# Patient Record
Sex: MALE | Race: ASIAN | NOT HISPANIC OR LATINO | URBAN - METROPOLITAN AREA
[De-identification: names, ages, dates, MRNs, and addresses within clinical notes are randomized per-mention and may not be internally consistent; named-entity substitution may affect disease eponyms.]

---

## 2017-10-05 ENCOUNTER — EMERGENCY (EMERGENCY)
Facility: HOSPITAL | Age: 52
LOS: 1 days | Discharge: HOME | End: 2017-10-05
Attending: EMERGENCY MEDICINE | Admitting: EMERGENCY MEDICINE
Payer: MEDICAID

## 2017-10-05 VITALS
OXYGEN SATURATION: 97 % | TEMPERATURE: 99 F | HEART RATE: 62 BPM | RESPIRATION RATE: 17 BRPM | DIASTOLIC BLOOD PRESSURE: 74 MMHG | SYSTOLIC BLOOD PRESSURE: 120 MMHG

## 2017-10-05 LAB
ALBUMIN SERPL ELPH-MCNC: 4.6 G/DL — SIGNIFICANT CHANGE UP (ref 3.3–5)
ALP SERPL-CCNC: 80 U/L — SIGNIFICANT CHANGE UP (ref 40–120)
ALT FLD-CCNC: 22 U/L RC — SIGNIFICANT CHANGE UP (ref 10–45)
ANION GAP SERPL CALC-SCNC: 12 MMOL/L — SIGNIFICANT CHANGE UP (ref 5–17)
APTT BLD: 29 SEC — SIGNIFICANT CHANGE UP (ref 27.5–37.4)
AST SERPL-CCNC: 19 U/L — SIGNIFICANT CHANGE UP (ref 10–40)
BASOPHILS # BLD AUTO: 0 K/UL — SIGNIFICANT CHANGE UP (ref 0–0.2)
BASOPHILS NFR BLD AUTO: 0.2 % — SIGNIFICANT CHANGE UP (ref 0–2)
BILIRUB SERPL-MCNC: 0.6 MG/DL — SIGNIFICANT CHANGE UP (ref 0.2–1.2)
BUN SERPL-MCNC: 8 MG/DL — SIGNIFICANT CHANGE UP (ref 7–23)
CALCIUM SERPL-MCNC: 10.2 MG/DL — SIGNIFICANT CHANGE UP (ref 8.4–10.5)
CHLORIDE SERPL-SCNC: 103 MMOL/L — SIGNIFICANT CHANGE UP (ref 96–108)
CO2 SERPL-SCNC: 27 MMOL/L — SIGNIFICANT CHANGE UP (ref 22–31)
CREAT SERPL-MCNC: 0.92 MG/DL — SIGNIFICANT CHANGE UP (ref 0.5–1.3)
EOSINOPHIL # BLD AUTO: 0.1 K/UL — SIGNIFICANT CHANGE UP (ref 0–0.5)
EOSINOPHIL NFR BLD AUTO: 1 % — SIGNIFICANT CHANGE UP (ref 0–6)
GLUCOSE SERPL-MCNC: 122 MG/DL — HIGH (ref 70–99)
HCT VFR BLD CALC: 42.6 % — SIGNIFICANT CHANGE UP (ref 39–50)
HGB BLD-MCNC: 15.1 G/DL — SIGNIFICANT CHANGE UP (ref 13–17)
INR BLD: 1.07 RATIO — SIGNIFICANT CHANGE UP (ref 0.88–1.16)
LYMPHOCYTES # BLD AUTO: 1.6 K/UL — SIGNIFICANT CHANGE UP (ref 1–3.3)
LYMPHOCYTES # BLD AUTO: 23.6 % — SIGNIFICANT CHANGE UP (ref 13–44)
MCHC RBC-ENTMCNC: 30.3 PG — SIGNIFICANT CHANGE UP (ref 27–34)
MCHC RBC-ENTMCNC: 35.4 GM/DL — SIGNIFICANT CHANGE UP (ref 32–36)
MCV RBC AUTO: 85.5 FL — SIGNIFICANT CHANGE UP (ref 80–100)
MONOCYTES # BLD AUTO: 0.4 K/UL — SIGNIFICANT CHANGE UP (ref 0–0.9)
MONOCYTES NFR BLD AUTO: 5.7 % — SIGNIFICANT CHANGE UP (ref 2–14)
NEUTROPHILS # BLD AUTO: 4.7 K/UL — SIGNIFICANT CHANGE UP (ref 1.8–7.4)
NEUTROPHILS NFR BLD AUTO: 69.4 % — SIGNIFICANT CHANGE UP (ref 43–77)
PLATELET # BLD AUTO: 186 K/UL — SIGNIFICANT CHANGE UP (ref 150–400)
POTASSIUM SERPL-MCNC: 4.2 MMOL/L — SIGNIFICANT CHANGE UP (ref 3.5–5.3)
POTASSIUM SERPL-SCNC: 4.2 MMOL/L — SIGNIFICANT CHANGE UP (ref 3.5–5.3)
PROT SERPL-MCNC: 8.4 G/DL — HIGH (ref 6–8.3)
PROTHROM AB SERPL-ACNC: 11.6 SEC — SIGNIFICANT CHANGE UP (ref 9.8–12.7)
RBC # BLD: 4.99 M/UL — SIGNIFICANT CHANGE UP (ref 4.2–5.8)
RBC # FLD: 11.5 % — SIGNIFICANT CHANGE UP (ref 10.3–14.5)
SODIUM SERPL-SCNC: 142 MMOL/L — SIGNIFICANT CHANGE UP (ref 135–145)
TROPONIN T SERPL-MCNC: <0.01 NG/ML — SIGNIFICANT CHANGE UP (ref 0–0.06)
WBC # BLD: 6.8 K/UL — SIGNIFICANT CHANGE UP (ref 3.8–10.5)
WBC # FLD AUTO: 6.8 K/UL — SIGNIFICANT CHANGE UP (ref 3.8–10.5)

## 2017-10-05 PROCEDURE — 70450 CT HEAD/BRAIN W/O DYE: CPT

## 2017-10-05 PROCEDURE — 85027 COMPLETE CBC AUTOMATED: CPT

## 2017-10-05 PROCEDURE — 93005 ELECTROCARDIOGRAM TRACING: CPT

## 2017-10-05 PROCEDURE — 99285 EMERGENCY DEPT VISIT HI MDM: CPT | Mod: 25

## 2017-10-05 PROCEDURE — 71045 X-RAY EXAM CHEST 1 VIEW: CPT

## 2017-10-05 PROCEDURE — 99284 EMERGENCY DEPT VISIT MOD MDM: CPT | Mod: 25

## 2017-10-05 PROCEDURE — 96374 THER/PROPH/DIAG INJ IV PUSH: CPT

## 2017-10-05 PROCEDURE — 84484 ASSAY OF TROPONIN QUANT: CPT

## 2017-10-05 PROCEDURE — 80053 COMPREHEN METABOLIC PANEL: CPT

## 2017-10-05 PROCEDURE — 85730 THROMBOPLASTIN TIME PARTIAL: CPT

## 2017-10-05 PROCEDURE — 93010 ELECTROCARDIOGRAM REPORT: CPT

## 2017-10-05 PROCEDURE — 85610 PROTHROMBIN TIME: CPT

## 2017-10-05 RX ORDER — MECLIZINE HCL 12.5 MG
25 TABLET ORAL ONCE
Qty: 0 | Refills: 0 | Status: COMPLETED | OUTPATIENT
Start: 2017-10-05 | End: 2017-10-05

## 2017-10-05 RX ORDER — ONDANSETRON 8 MG/1
4 TABLET, FILM COATED ORAL ONCE
Qty: 0 | Refills: 0 | Status: COMPLETED | OUTPATIENT
Start: 2017-10-05 | End: 2017-10-05

## 2017-10-05 RX ORDER — SODIUM CHLORIDE 9 MG/ML
1000 INJECTION INTRAMUSCULAR; INTRAVENOUS; SUBCUTANEOUS
Qty: 0 | Refills: 0 | Status: DISCONTINUED | OUTPATIENT
Start: 2017-10-05 | End: 2017-10-09

## 2017-10-05 RX ADMIN — ONDANSETRON 4 MILLIGRAM(S): 8 TABLET, FILM COATED ORAL at 22:56

## 2017-10-05 RX ADMIN — SODIUM CHLORIDE 150 MILLILITER(S): 9 INJECTION INTRAMUSCULAR; INTRAVENOUS; SUBCUTANEOUS at 22:57

## 2017-10-05 RX ADMIN — Medication 25 MILLIGRAM(S): at 22:56

## 2017-10-05 NOTE — CONSULT NOTE ADULT - ASSESSMENT
51 yo man who presents to [facility] w/ new and acute 12 hour onset of dizziness upon waking up from bed this morning, worse w/ ambulation & when eyes are closed. Patient also noted taking 500mg amoxicillin that he had at home for "ear pain" right before going to bed.  ROS also revealed N/V, otherwise unremarkable for headache, acute vision changes, weakness, or numbness. Neurology consulted for further evaluation of cerebellar infarction. NIHSS 0 MRS 0.

## 2017-10-05 NOTE — ED ADULT NURSE NOTE - OBJECTIVE STATEMENT
51 y/o male presenting to the ED complaining of dizziness; Per patient states woke up this morning and felt like the room was spinning; Patient denies any vision changes; Positive PEERLA; Steady gait noted upon arrival; Patient denies chest pain, SOB, weakness, vomiting, diarrhea, urinary s/s; Positive strength and sensation in extremities; Patient complaining of nausea as well; a&ox3; safety and comfort measures provided

## 2017-10-05 NOTE — CONSULT NOTE ADULT - SUBJECTIVE AND OBJECTIVE BOX
Neurology Consult    Name: YU STEWART    HPI: 53 yo man who presents to [facility] w/ new and acute 12 hour onset of dizziness upon waking up from bed this morning, worse w/ ambulation & when eyes are closed. ROS also revealed N/V, otherwise unremarkable for headache, acute vision changes, weakness, or numbness. Neurology consulted for further evaluation of cerebellar infarction. NIHSS MRS 0.      PMH/PSH: none    MEDICATIONS  (home): none    Allergies: No Known Allergies    Objective:   Vital Signs Last 24 Hrs  T(C): 36.8 (05 Oct 2017 22:55), Max: 37.1 (05 Oct 2017 20:51)  T(F): 98.3 (05 Oct 2017 22:55), Max: 98.7 (05 Oct 2017 20:51)  HR: 66 (05 Oct 2017 22:55) (62 - 66)  BP: 121/85 (05 Oct 2017 22:55) (120/74 - 121/85)  RR: 16 (05 Oct 2017 22:55) (16 - 17)  SpO2: 100% (05 Oct 2017 22:55) (97% - 100%)    General Exam:   General appearance: No acute distress                   Neurological Exam:  Mental Status: AAOx3, fluent speech, follows commands    Cranial Nerves: EOMI, PERRL, V1-V3 intact, facial symmetry intact, no dysarthria, tongue midline, VFF    Motor: 5/5 throughout. No drift x4    Sensation: Intact to LT throughout    Coordination: FTN intact b/l    Reflexes: 1+ bilateral biceps, brachioradialis, patellar and ankle    Gait: normal and stable.      Labs:                Radiology Neurology Consult    Name: YU STEWART    HPI: 51 yo man who presents to [facility] w/ new and acute 12 hour onset of dizziness upon waking up from bed this morning, worse w/ ambulation & when eyes are closed. Patient also noted taking 500mg amoxicillin that he had at home for "ear pain" right before going to bed.  ROS also revealed N/V, otherwise unremarkable for headache, acute vision changes, weakness, or numbness. Neurology consulted for further evaluation of cerebellar infarction. NIHSS 0 MRS 0.      PMH/PSH: none    MEDICATIONS  (home): none    Allergies: No Known Allergies    Objective:   Vital Signs Last 24 Hrs  T(C): 36.8 (05 Oct 2017 22:55), Max: 37.1 (05 Oct 2017 20:51)  T(F): 98.3 (05 Oct 2017 22:55), Max: 98.7 (05 Oct 2017 20:51)  HR: 66 (05 Oct 2017 22:55) (62 - 66)  BP: 121/85 (05 Oct 2017 22:55) (120/74 - 121/85)  RR: 16 (05 Oct 2017 22:55) (16 - 17)  SpO2: 100% (05 Oct 2017 22:55) (97% - 100%)    General Exam:   General appearance: No acute distress                   Neurological Exam:  Mental Status: AAOx3, fluent speech, follows commands    Cranial Nerves: EOMI, no nystagmus, PERRL, V1-V3 intact, facial symmetry intact, no dysarthria, tongue midline, VFF    Motor: 5/5 throughout. No drift x4    Sensation: Intact to LT throughout    Coordination: FTN intact b/l    Reflexes: Babinski absent b/l  (toes downgoing b/l)    Gait: not assessed    Other: Melissa-andrews pike + (right-sided)     Labs:                Radiology

## 2017-10-05 NOTE — ED PROVIDER NOTE - ATTENDING CONTRIBUTION TO CARE
------------ATTENDING NOTE------------   53 yo M w/ wife c/o feeling dizzy, describing vertigo, constant since waking up this evening (>12 hrs), worse w/ walking, worse w/ closing eyes, associated nausea w/o vomiting, no headache, no weakness/numbness, concerns cerebellar CVA, awaiting labs/imaging and Neuro consult -->  - Corey Lara MD   ------------------------------------------------------------------------------ ------------ATTENDING NOTE------------   53 yo M w/ wife c/o feeling dizzy, describing vertigo, constant since waking up this evening (>12 hrs), worse w/ walking, worse w/ closing eyes, associated nausea w/o vomiting, no headache, no weakness/numbness, concerns cerebellar CVA, awaiting labs/imaging and Neuro consult --> symptoms resolved, CT wnl, cleared by Neuro, in depth d/w pt/wife about ddx, tx, schroeder, fu.  - Corey Lara MD   ------------------------------------------------------------------------------

## 2017-10-05 NOTE — ED PROVIDER NOTE - PHYSICAL EXAMINATION
Well Appearing, Nontoxic, NAD;  Symm Facies, PERRL 3mm, (-)Pallor, Anicteric, VF/VA wnl, (+)Nystagmus, TM clear bilar, MMM;  No JVD/Bruits or stridor;  RRR w/o m/g/r;   CTAB w/o w/r/r;   Abd soft, nt/nd, +bs; No edema/calf tender;  No rash;  AOX3, Normal speech, CN grossly intact, normal strength/sensation, +Romberg, ?ataxia heel/shin, NIHSS = 1

## 2017-10-05 NOTE — CONSULT NOTE ADULT - PROBLEM SELECTOR RECOMMENDATION 9
likely peripheral (iatrogenic vs. BPPV), no focal neurologic deficits (NIHSS 0)   -Epley maneuver   -CDU admission for overnight observation & MRI brain for further evaluation (r/o central etiology, although unlikely)   -meclizine for symptomatic care   -Although acute infarct is highly unlikely, given patient's age and pending Lipid Panel, secondary stroke prevention should be at least considered w/ daily baby ASA 81mg.   -ENT outpatient for further evaluation of right-ear pain & tinnitus   -patient can follow up outpatient neurology s/p discharge.

## 2017-10-06 ENCOUNTER — EMERGENCY (EMERGENCY)
Facility: HOSPITAL | Age: 52
LOS: 1 days | End: 2017-10-06
Attending: EMERGENCY MEDICINE | Admitting: EMERGENCY MEDICINE
Payer: MEDICAID

## 2017-10-06 VITALS
DIASTOLIC BLOOD PRESSURE: 87 MMHG | RESPIRATION RATE: 19 BRPM | HEART RATE: 62 BPM | OXYGEN SATURATION: 97 % | TEMPERATURE: 98 F | SYSTOLIC BLOOD PRESSURE: 146 MMHG | WEIGHT: 195.11 LBS

## 2017-10-06 VITALS
TEMPERATURE: 97 F | DIASTOLIC BLOOD PRESSURE: 81 MMHG | HEART RATE: 86 BPM | OXYGEN SATURATION: 99 % | SYSTOLIC BLOOD PRESSURE: 117 MMHG | RESPIRATION RATE: 16 BRPM

## 2017-10-06 DIAGNOSIS — H92.03 OTALGIA, BILATERAL: ICD-10-CM

## 2017-10-06 DIAGNOSIS — R42 DIZZINESS AND GIDDINESS: ICD-10-CM

## 2017-10-06 PROBLEM — Z00.00 ENCOUNTER FOR PREVENTIVE HEALTH EXAMINATION: Status: ACTIVE | Noted: 2017-10-06

## 2017-10-06 LAB
ALBUMIN SERPL ELPH-MCNC: 4.4 G/DL — SIGNIFICANT CHANGE UP (ref 3.3–5)
ALP SERPL-CCNC: 67 U/L — SIGNIFICANT CHANGE UP (ref 40–120)
ALT FLD-CCNC: 18 U/L RC — SIGNIFICANT CHANGE UP (ref 10–45)
ANION GAP SERPL CALC-SCNC: 12 MMOL/L — SIGNIFICANT CHANGE UP (ref 5–17)
AST SERPL-CCNC: 17 U/L — SIGNIFICANT CHANGE UP (ref 10–40)
BASOPHILS # BLD AUTO: 0 K/UL — SIGNIFICANT CHANGE UP (ref 0–0.2)
BASOPHILS NFR BLD AUTO: 0.3 % — SIGNIFICANT CHANGE UP (ref 0–2)
BILIRUB SERPL-MCNC: 0.5 MG/DL — SIGNIFICANT CHANGE UP (ref 0.2–1.2)
BUN SERPL-MCNC: 9 MG/DL — SIGNIFICANT CHANGE UP (ref 7–23)
CALCIUM SERPL-MCNC: 9.7 MG/DL — SIGNIFICANT CHANGE UP (ref 8.4–10.5)
CHLORIDE SERPL-SCNC: 105 MMOL/L — SIGNIFICANT CHANGE UP (ref 96–108)
CO2 SERPL-SCNC: 28 MMOL/L — SIGNIFICANT CHANGE UP (ref 22–31)
CREAT SERPL-MCNC: 1.05 MG/DL — SIGNIFICANT CHANGE UP (ref 0.5–1.3)
EOSINOPHIL # BLD AUTO: 0 K/UL — SIGNIFICANT CHANGE UP (ref 0–0.5)
EOSINOPHIL NFR BLD AUTO: 0.6 % — SIGNIFICANT CHANGE UP (ref 0–6)
GLUCOSE SERPL-MCNC: 104 MG/DL — HIGH (ref 70–99)
HCT VFR BLD CALC: 40.8 % — SIGNIFICANT CHANGE UP (ref 39–50)
HGB BLD-MCNC: 14.1 G/DL — SIGNIFICANT CHANGE UP (ref 13–17)
LYMPHOCYTES # BLD AUTO: 1.4 K/UL — SIGNIFICANT CHANGE UP (ref 1–3.3)
LYMPHOCYTES # BLD AUTO: 22.7 % — SIGNIFICANT CHANGE UP (ref 13–44)
MCHC RBC-ENTMCNC: 29.8 PG — SIGNIFICANT CHANGE UP (ref 27–34)
MCHC RBC-ENTMCNC: 34.6 GM/DL — SIGNIFICANT CHANGE UP (ref 32–36)
MCV RBC AUTO: 86.2 FL — SIGNIFICANT CHANGE UP (ref 80–100)
MONOCYTES # BLD AUTO: 0.5 K/UL — SIGNIFICANT CHANGE UP (ref 0–0.9)
MONOCYTES NFR BLD AUTO: 8.4 % — SIGNIFICANT CHANGE UP (ref 2–14)
NEUTROPHILS # BLD AUTO: 4.2 K/UL — SIGNIFICANT CHANGE UP (ref 1.8–7.4)
NEUTROPHILS NFR BLD AUTO: 68.1 % — SIGNIFICANT CHANGE UP (ref 43–77)
PLATELET # BLD AUTO: 177 K/UL — SIGNIFICANT CHANGE UP (ref 150–400)
POTASSIUM SERPL-MCNC: 3.9 MMOL/L — SIGNIFICANT CHANGE UP (ref 3.5–5.3)
POTASSIUM SERPL-SCNC: 3.9 MMOL/L — SIGNIFICANT CHANGE UP (ref 3.5–5.3)
PROT SERPL-MCNC: 7.7 G/DL — SIGNIFICANT CHANGE UP (ref 6–8.3)
RBC # BLD: 4.73 M/UL — SIGNIFICANT CHANGE UP (ref 4.2–5.8)
RBC # FLD: 11.5 % — SIGNIFICANT CHANGE UP (ref 10.3–14.5)
SODIUM SERPL-SCNC: 145 MMOL/L — SIGNIFICANT CHANGE UP (ref 135–145)
WBC # BLD: 6.2 K/UL — SIGNIFICANT CHANGE UP (ref 3.8–10.5)
WBC # FLD AUTO: 6.2 K/UL — SIGNIFICANT CHANGE UP (ref 3.8–10.5)

## 2017-10-06 PROCEDURE — 70450 CT HEAD/BRAIN W/O DYE: CPT | Mod: 26

## 2017-10-06 PROCEDURE — 71010: CPT | Mod: 26

## 2017-10-06 PROCEDURE — 99220: CPT

## 2017-10-06 RX ORDER — MECLIZINE HCL 12.5 MG
25 TABLET ORAL ONCE
Qty: 0 | Refills: 0 | Status: COMPLETED | OUTPATIENT
Start: 2017-10-06 | End: 2017-10-06

## 2017-10-06 RX ORDER — SODIUM CHLORIDE 9 MG/ML
3 INJECTION INTRAMUSCULAR; INTRAVENOUS; SUBCUTANEOUS EVERY 8 HOURS
Qty: 0 | Refills: 0 | Status: DISCONTINUED | OUTPATIENT
Start: 2017-10-06 | End: 2017-10-10

## 2017-10-06 RX ADMIN — SODIUM CHLORIDE 3 MILLILITER(S): 9 INJECTION INTRAMUSCULAR; INTRAVENOUS; SUBCUTANEOUS at 21:19

## 2017-10-06 RX ADMIN — Medication 25 MILLIGRAM(S): at 17:50

## 2017-10-06 NOTE — CONSULT NOTE ADULT - SUBJECTIVE AND OBJECTIVE BOX
CC: Left ear pain    HPI: Patient is a 52y old Male with no significant PMHx seen at [facility] yesterday for sudden dizziness which started 2 days ago. Patient returns today with same complaint and also states he has ear pain bilaterally which started 2 days ago. Pt states he has chronic itching of his ear canals after showering typically. Yesterday, after showering he inserted ear drops, itched with q-tips and placed cotton balls to soak up the fluid from his ears. Patient does not know the name of the ear drops. He bought them from [place]. Patient complains of tinnitus of ears bilaterally since this and states he woke up the next morning feeling dizzy. He feels room spins for about 40 secs when he goes from a supine position to sitting position and then stops. Denies any hearing loss, discharge from ears, N/V, SOB, headaches, nasal congestion, rhinorrhea, or fever.    PAST MEDICAL & SURGICAL HISTORY:  No pertinent past medical history  No significant past surgical history    Allergies    No Known Allergies    Intolerances      MEDICATIONS  (STANDING):  sodium chloride 0.9% lock flush 3 milliLiter(s) IV Push every 8 hours    MEDICATIONS  (PRN):    Social History: No history of tobacco use, EtOH use, illicit drugs    ROS: ENT, GI, , CV, Pulm, Neuro, Psych, MS, Heme, Endo, Constitutional; all negative except as noted in HPI    Vital Signs Last 24 Hrs  T(C): 36.7 (06 Oct 2017 19:37), Max: 36.9 (06 Oct 2017 16:47)  T(F): 98 (06 Oct 2017 19:37), Max: 98.4 (06 Oct 2017 16:47)  HR: 60 (06 Oct 2017 19:37) (60 - 86)  BP: 125/80 (06 Oct 2017 19:37) (117/81 - 146/87)  BP(mean): --  RR: 18 (06 Oct 2017 19:37) (16 - 19)  SpO2: 99% (06 Oct 2017 19:37) (97% - 100%)                          14.1   6.2   )-----------( 177      ( 06 Oct 2017 18:00 )             40.8    10-06    145  |  105  |  9   ----------------------------<  104<H>  3.9   |  28  |  1.05    Ca    9.7      06 Oct 2017 18:00    TPro  7.7  /  Alb  4.4  /  TBili  0.5  /  DBili  x   /  AST  17  /  ALT  18  /  AlkPhos  67  10-06   PT/INR - ( 05 Oct 2017 22:56 )   PT: 11.6 sec;   INR: 1.07 ratio         PTT - ( 05 Oct 2017 22:56 )  PTT:29.0 sec    PHYSICAL EXAM:  Gen: Awake and alert, NAD, well-developed  Head: Normocephalic, Atraumatic  Face: no edema/erythema/fluctuance, parotid glands soft without mass  Eyes: PERRL, EOMI, no scleral injection  Ears: Right - ear canal minimal cerumen, TM intact without effusion. No edema, erythema, pus            Left - ear canal minimal cerumen, TM intact without effusion. No edema, erythema, pus  Nose: Nares bilaterally patent, no discharge  Mouth: Mucosa moist, tongue/uvula midline, oropharynx clear  Neck: Flat, supple, no lymphadenopathy, trachea midline, no masses  Resp: breathing easily, no stridor  CV: No peripheral edema or cyanosis
Neurology Consult Note    Pt is a 51 yo man who returns to [facility] w/ due to dizziness upon waking up from bed yesterday morning, worse w/ ambulation & when eyes are closed. Patient also noted taking 500mg amoxicillin that he had at home for "ear pain" right before going to bed.  ROS also revealed N/V, otherwise unremarkable for headache, acute vision changes, weakness, or numbness. He came into the ED yesterday however did not stay for MRI in the CDU.  Neurology was re-consulted for further evaluation of cerebellar infarction. NIHSS 0 MRS 0.      PMH/PSH: none    MEDICATIONS  (STANDING):  sodium chloride 0.9% lock flush 3 milliLiter(s) IV Push every 8 hours    MEDICATIONS  (PRN):    Vital Signs Last 24 Hrs  T(C): 36.7 (06 Oct 2017 19:37), Max: 37.1 (05 Oct 2017 20:51)  T(F): 98 (06 Oct 2017 19:37), Max: 98.7 (05 Oct 2017 20:51)  HR: 60 (06 Oct 2017 19:37) (60 - 86)  BP: 125/80 (06 Oct 2017 19:37) (117/81 - 146/87)  BP(mean): --  RR: 18 (06 Oct 2017 19:37) (16 - 19)  SpO2: 99% (06 Oct 2017 19:37) (97% - 100%)    General Exam:   General appearance: No acute distress                   Neurological Exam:  Mental Status: AAOx3, fluent speech, follows commands    Cranial Nerves: EOMI, no nystagmus, PERRL, V1-V3 intact, facial symmetry intact, no dysarthria, tongue midline, VFF    Motor: 5/5 throughout. No drift x4    Sensation: Intact to LT throughout    Coordination: FTN intact b/l    Reflexes: Babinski absent b/l  (toes downgoing b/l)    Gait: not assessed

## 2017-10-06 NOTE — ED ADULT NURSE REASSESSMENT NOTE - NS ED NURSE REASSESS COMMENT FT1
Received pt from ARNOLDO ROJO) , received pt alert and responsive, oriented x4, denies any respiratory distress, SOB, or difficulty breathing. Pt transferred to CDU for observation for dizziness, pt states when he is laying or sitting still he does not feel dizzy, + dizzy sensation with movement/ ambulation feeling like " everything is spinning" neuro check completed, neuro intact no deficits. will assist as needed, IV in place, patent and free of signs of infiltration, placed on continuos cardiac monitoring as ordered, NSR HR in the,  pt denies chest pain or palpitations, V/S stable, pt afebrile, pt denies pain at this time. Pt educated on unit and unit rules, instructed patient to notify RN of any needed assistance, Pt verbalizes understanding, Call bell placed within reach. Safety maintained. Will continue to monitor. Received pt from ARNOLDO ROJO) , received pt alert and responsive, oriented x4, denies any respiratory distress, SOB, or difficulty breathing. Pt transferred to CDU for observation for dizziness, pt states when he is laying or sitting still he does not feel dizzy, +dizziness with movement/ ambulation pt states sensation feels like " everything is spinning" neuro check completed, neuro intact no deficits. denies any blurred vision or changes in vision, denies headache. Pt denies chest pain or palpitations,  will assist as needed, IV in place, patent and free of signs of infiltration, placed on continuous cardiac monitoring as ordered, NSR HR in the 70's,   V/S stable, pt afebrile, pt denies pain at this time. Pt educated on unit and unit rules, instructed patient to notify RN of any needed assistance, Pt verbalizes understanding, Call bell placed within reach. Safety maintained. Will continue to monitor. Pending MRI/ MRA. Received pt from ARNOLDO ROJO) , received pt alert and responsive, oriented x4, denies any respiratory distress, SOB, or difficulty breathing. Pt transferred to CDU for observation for dizziness, pt states when he is laying or sitting still he does not feel dizzy, +dizziness with movement/ ambulation pt states sensation feels like " everything is spinning" neuro check completed, neuro intact no deficits. denies any blurred vision or changes in vision, denies headache, denies L ear pain/ tinnitus. Pt denies chest pain or palpitations,  will assist as needed, IV in place, patent and free of signs of infiltration, placed on continuous cardiac monitoring as ordered, NSR HR in the 70's,   V/S stable, pt afebrile, pt denies pain at this time. Pt educated on unit and unit rules, instructed patient to notify RN of any needed assistance, Pt verbalizes understanding, Call bell placed within reach. Safety maintained. Will continue to monitor. Pending MRI/ MRA.

## 2017-10-06 NOTE — ED CDU PROVIDER NOTE - OBJECTIVE STATEMENT
52 YOM presents to ed for repeat visit with similar symptoms as yesterday. Pt has had dizzy, describing vertigo, constant since yesterday worse w/ walking, worse w/ closing eyes, associated nausea. Pt also has Left ear pain that started before the vertigo symptoms and ringing in the left ear.      In ED patient describes onset of dizziness (room spinning yesterday) which has been constant and worse with movement. Today with 2 episodes of vomiting. Describes b/l tinnitus and left ear pain for 3 days. Symptoms unrelieved with meclizine

## 2017-10-06 NOTE — ED ADULT NURSE REASSESSMENT NOTE - NS ED NURSE REASSESS COMMENT FT1
Patient appears to be resting comfortably in stretcher; Patient denies dizziness currently; Patient denies chest pain, sob, dizziness, n/v/d; a&ox3; safety and comfort measures provided

## 2017-10-06 NOTE — ED CDU PROVIDER NOTE - MEDICAL DECISION MAKING DETAILS
Pt with vertigo persistent nausea and vomiting without response to usual meds. With tinnitus, For mri/ent consult neuro checks  Zane Freeman MD, Facep

## 2017-10-06 NOTE — ED CDU PROVIDER NOTE - PLAN OF CARE
1. Follow up with your primary care doctor or medicine clinic [phone] in the next 1-2 days (bring results with you)  2. Follow up with neurology _______   3. Follow up with ENT  _____  4. Return to ED for change of symptoms including worsening dizziness, headaches, worsening nausea, vomiting, visual changes, weakness, fevers and any other symptoms of concern 1. Follow up with your primary care doctor or medicine clinic [phone] in the next 1-2 days (bring results with you)  2. Follow up with neurology [phone]  3. Follow up with ENT at [facility] .  4. Return to ED for change of symptoms including worsening dizziness, headaches, worsening nausea, vomiting, visual changes, weakness, fevers and any other symptoms of concern  5. use debrox for ear itching 1. Follow up with your primary care doctor or medicine clinic [phone] in the next 1-2 days (bring results with you)  2. Take meclizine 25mg up to twice daily for dizziness. Follow up with neurology [phone]  3. Use Debrox ear drops for ear itching: instill 5 drops into each ear twice daily for up to 4 days. Follow up with ENT at [facility] .   4. Return to ED for change of symptoms including worsening dizziness, headaches, worsening nausea, vomiting, visual changes, weakness, fevers and any other symptoms of concern 1. Follow up with your primary care doctor or medicine clinic [phone] in the next 1-2 days (bring printed results with you)  2. Take meclizine 25mg up to twice daily for dizziness. Follow up with neurology [phone] as needed for further evaluation.  3. Use Debrox ear drops for ear itching: instill 5 drops into each ear twice daily for up to 4 days. Follow up with ENT Dr. Guidry at [facility] .   4. Return to ED for worsening or change of symptoms including worsening dizziness, headaches, worsening nausea, vomiting, visual changes, weakness, fevers and any other symptoms of concern.

## 2017-10-06 NOTE — ED CDU PROVIDER NOTE - DETAILS
Frequent reevals, neuro checks q 4 hours, MRI head, neuro and ENT consult discussed with attending Dr. Freeman

## 2017-10-06 NOTE — ED PROVIDER NOTE - ATTENDING CONTRIBUTION TO CARE
Private Physician None,   52y  male pmh chronic ear infections, comes to ed complains of vertigo yesterday. Was seen in ED and rec MRI. Today now returns with persisetent symptoms, NV x2, No fever chills shortness of breath,chest pain. PE WDWN NCAT,  NECK Clear anterior & posterior abd soft +bs angel gcs 15 speech fluent, power 5.5 all extr pain light touch intact  Zane Freeman MD, Facep

## 2017-10-06 NOTE — ED ADULT NURSE NOTE - CHPI ED SYMPTOMS NEG
no change in level of consciousness/no numbness/no confusion/no weakness/no loss of consciousness/no blurred vision/no fever

## 2017-10-06 NOTE — ED CDU PROVIDER NOTE - PHYSICAL EXAMINATION
CN 2-12 intact. + horizontal nystagmus most notable w/ leftward gaze. Normal sensation and strength of upper and lower extremities b/l. FROM of neck, upper and lower extremities b/l. No focal neuro deficits.

## 2017-10-06 NOTE — ED PROVIDER NOTE - NS ED ROS FT
CONSTITUTIONAL: No fevers, no chills  Eyes: no visual changes  Ears: no ear drainage,+ ear pain  Nose: no nasal congestion  Mouth/Throat: no sore throat  Cardiovascular: No Chest pain  Respiratory: No SOB  Gastrointestinal: No n/v/d, no abd pain  Genitourinary: no dysuria, no hematuria  SKIN: no rashes.  NEURO: no headache, +vertigo   PSYCHIATRIC: no known mental health issues.

## 2017-10-06 NOTE — ED CDU PROVIDER NOTE - ATTENDING CONTRIBUTION TO CARE
I have personally performed a face to face diagnostic evaluation on this patient.  I have reviewed the ACP note and agree with the history, exam, and plan of care, except as noted.  History and Exam by me shows  See ED provider note  Zane Freeman MD, Facep

## 2017-10-06 NOTE — ED PROVIDER NOTE - OBJECTIVE STATEMENT
52 YOM presents to ed for repeat visit with similar symptoms as yesterday. Pt has had dizzy, describing vertigo, constant since yesterday worse w/ walking, worse w/ closing eyes, associated nausea. Pt also has Left ear pain that started before the vertigo symptoms and ringing in the left ear.

## 2017-10-06 NOTE — CONSULT NOTE ADULT - ASSESSMENT
Pt is a 53 yo man who returns to [facility] w/ due to dizziness upon waking up from bed yesterday morning, worse w/ ambulation & when eyes are closed. Patient also noted taking 500mg amoxicillin that he had at home for "ear pain" right before going to bed.  ROS also revealed N/V, otherwise unremarkable for headache, acute vision changes, weakness, or numbness. He came into the ED yesterday however did not stay for MRI in the CDU.  Neurology was re-consulted for further evaluation of cerebellar infarction. NIHSS 0 MRS 0.      Recommendations: likely peripheral (iatrogenic vs. BPPV), no focal neurologic deficits (NIHSS 0)   -CDU admission for overnight observation & MRI brain for further evaluation (r/o central etiology, although unlikely)   -meclizine for symptomatic care   -Although acute infarct is highly unlikely, given patient's age and pending Lipid Panel, secondary stroke prevention should be at least considered w/ daily ASA 81mg.   -ENT to see the pt, right-ear pain & tinnitus   -patient can follow up outpatient neurology if MRI in normal
51 y/o M with mild ear pain/tinnitus post q-tip use to aggressively itch ears 2 days ago. Normal ear exam. Pt currently stable.

## 2017-10-06 NOTE — ED ADULT NURSE NOTE - OBJECTIVE STATEMENT
51 y/o male presents to ED c/o dizziness since last night, left ear pain that has been bothering him for months. Pt denies having history of vertigo. Pt stats he feels like room is spinning. Also reports nausea and worsening of symptoms when he walks. States he has 1 episode of vomiting this morning. Pt denies chest pain, SOB. Lungs clear b/l. Skin warm, dry, intact. Gross motor and neuro intact. PERRL. Pt safety and comfort provided.

## 2017-10-06 NOTE — ED CDU PROVIDER NOTE - PROGRESS NOTE DETAILS
CDU NOTE ROSAURA SHEETS: NAD VSS.  Patient resting comfortably and has no current complaints. no current dizziness. awaiting neuro and ent eval CDU NOTE ROSAURA SHEETS: NAD VSS.  Patient sleeping. NAD. No complaints. ENT evaluated patient, still awaiting neurology Patient sleeping. NAD. No complaints. VSS. - Sanjana Magana PA-C Patient seen and evaluated at bedside. NAD. Reports intermittent dizziness. Denies nausea/vomiting. VSS. On exam, + horizontal nystagmus L>R. No events on tele. - Jackie Mares PA-C Pt with negative MRI. Spoke with neurology resident, reports Dr. Perez will be coming to see patient in CDU. - Jackie Mares PA-C Patient feeling well. Denies current dizziness, nausea, vomiting. Discussed MRI results and d/c plan with patient. Patient is to use debrox drops x 4 days, take meclizine prn, and f/u with ENT as outpatient. Patient seen by neurology with no further w/u required. VSS. D/w Dr. Pierce. - Jackie Mares, PA-C I have personally performed a face to face diagnostic evaluation on this patient.  I have reviewed the ACP note and agree with the history, exam, and plan of care, except as noted.  History and Exam by me shows  52M presents to the ED with persistent dizziness like the room is spinning in the setting of recent ED visit - pt was placed in CDU for MRI - MRI showed no signs of VBI. Neuro evaluated patient and cleared for dc. pt says that he feels like the dizziness started with ear fullness and has had this before. exam with CN2-12 intact normal strenght and sensation. Tolerating PO. normal vitals. ambulating without issue. Likely inner ear problem and peripheral vertigo. Will have patient follow up with ENT. all questions answered. Corey Pierce M.D., Attending Physician

## 2017-10-06 NOTE — ED CDU PROVIDER NOTE - ENMT, MLM
Airway patent. Nasal mucosa clear. Mouth with normal mucosa. Throat has no vesicles, no oropharyngeal exudates and uvula is midline. TM's with cloudiness b/l Airway patent. Nasal mucosa clear. Mouth with normal mucosa. Throat has no vesicles, no oropharyngeal exudates and uvula is midline. TM's with cloudiness and mild erythema b/l

## 2017-10-07 VITALS
DIASTOLIC BLOOD PRESSURE: 79 MMHG | RESPIRATION RATE: 18 BRPM | SYSTOLIC BLOOD PRESSURE: 118 MMHG | TEMPERATURE: 98 F | HEART RATE: 69 BPM | OXYGEN SATURATION: 98 %

## 2017-10-07 LAB
CHOLEST SERPL-MCNC: 174 MG/DL — SIGNIFICANT CHANGE UP (ref 10–199)
HBA1C BLD-MCNC: 5.4 % — SIGNIFICANT CHANGE UP (ref 4–5.6)
HDLC SERPL-MCNC: 34 MG/DL — LOW (ref 40–125)
LIPID PNL WITH DIRECT LDL SERPL: 115 MG/DL — SIGNIFICANT CHANGE UP
TOTAL CHOLESTEROL/HDL RATIO MEASUREMENT: 5.1 RATIO — SIGNIFICANT CHANGE UP (ref 3.4–9.6)
TRIGL SERPL-MCNC: 125 MG/DL — SIGNIFICANT CHANGE UP (ref 10–149)

## 2017-10-07 PROCEDURE — 70551 MRI BRAIN STEM W/O DYE: CPT

## 2017-10-07 PROCEDURE — 70553 MRI BRAIN STEM W/O & W/DYE: CPT | Mod: 26

## 2017-10-07 PROCEDURE — 70548 MR ANGIOGRAPHY NECK W/DYE: CPT

## 2017-10-07 PROCEDURE — G0378: CPT

## 2017-10-07 PROCEDURE — 70544 MR ANGIOGRAPHY HEAD W/O DYE: CPT | Mod: 26,59

## 2017-10-07 PROCEDURE — A9585: CPT

## 2017-10-07 PROCEDURE — 99217: CPT

## 2017-10-07 PROCEDURE — 83036 HEMOGLOBIN GLYCOSYLATED A1C: CPT

## 2017-10-07 PROCEDURE — 70547 MR ANGIOGRAPHY NECK W/O DYE: CPT | Mod: 26

## 2017-10-07 PROCEDURE — 85027 COMPLETE CBC AUTOMATED: CPT

## 2017-10-07 PROCEDURE — 99284 EMERGENCY DEPT VISIT MOD MDM: CPT | Mod: 25

## 2017-10-07 PROCEDURE — 80053 COMPREHEN METABOLIC PANEL: CPT

## 2017-10-07 PROCEDURE — 99282 EMERGENCY DEPT VISIT SF MDM: CPT

## 2017-10-07 PROCEDURE — 80061 LIPID PANEL: CPT

## 2017-10-07 PROCEDURE — 70544 MR ANGIOGRAPHY HEAD W/O DYE: CPT

## 2017-10-07 RX ORDER — CARBAMIDE PEROXIDE 81.86 MG/ML
5 SOLUTION/ DROPS AURICULAR (OTIC)
Qty: 1 | Refills: 0
Start: 2017-10-07

## 2017-10-07 RX ORDER — CARBAMIDE PEROXIDE 81.86 MG/ML
5 SOLUTION/ DROPS AURICULAR (OTIC) DAILY
Qty: 0 | Refills: 0 | Status: DISCONTINUED | OUTPATIENT
Start: 2017-10-07 | End: 2017-10-07

## 2017-10-07 RX ORDER — MECLIZINE HCL 12.5 MG
1 TABLET ORAL
Qty: 14 | Refills: 0
Start: 2017-10-07 | End: 2017-10-14

## 2017-10-07 RX ADMIN — SODIUM CHLORIDE 3 MILLILITER(S): 9 INJECTION INTRAMUSCULAR; INTRAVENOUS; SUBCUTANEOUS at 05:51

## 2017-10-07 NOTE — ED ADULT NURSE REASSESSMENT NOTE - NS ED NURSE REASSESS COMMENT FT1
12.00 Pt had no signs of vertigo in CDU pt tolerated the oral fluids/food  well. Flora N/V/D fever chills CP/SOB  afebrile here Pt was evaluated by CDU team & Neuro team. ROSAURA Mejia educated the pt n follow up care medication  & pt verbalized the understanding on the same matter

## 2017-10-07 NOTE — ED ADULT NURSE REASSESSMENT NOTE - NS ED NURSE REASSESS COMMENT FT1
0815 Pt  resting   .  Flora pain N/V/D fever chills cp SOB Afebrile here   has stable vital signs . NSR on  cardiac monitor Pt went for MRI

## 2017-10-07 NOTE — ED ADULT NURSE REASSESSMENT NOTE - COMFORT CARE
plan of care explained/darkened lights/warm blanket provided/repositioned
plan of care explained/wait time explained/repositioned/side rails up

## 2017-10-07 NOTE — ED ADULT NURSE REASSESSMENT NOTE - NS ED NURSE REASSESS COMMENT FT1
Pt denies any complaints, VSS, on telemetry SR on monitor hr: 60's.  Pt denies pain/discomfort at this time. Neuro intact, monitoring continues. Asleep on stretcher. Pt denies any complaints, VSS, on telemetry SR on monitor hr: 60's.  Pt denies pain/discomfort at this time. Neuro intact, monitoring continues. Asleep on stretcher. Call bell at bedside.

## 2017-10-07 NOTE — ED ADULT NURSE REASSESSMENT NOTE - GENERAL PATIENT STATE
improvement verbalized/resting/sleeping/smiling/interactive/cooperative/comfortable appearance
comfortable appearance
comfortable appearance

## 2017-10-09 ENCOUNTER — EMERGENCY (EMERGENCY)
Facility: HOSPITAL | Age: 52
LOS: 1 days | Discharge: HOME | End: 2017-10-09
Admitting: EMERGENCY MEDICINE
Payer: MEDICAID

## 2017-10-09 PROCEDURE — 99283 EMERGENCY DEPT VISIT LOW MDM: CPT

## 2017-10-09 PROCEDURE — 99282 EMERGENCY DEPT VISIT SF MDM: CPT

## 2017-11-30 ENCOUNTER — APPOINTMENT (OUTPATIENT)
Dept: OTOLARYNGOLOGY | Facility: CLINIC | Age: 52
End: 2017-11-30

## 2018-01-20 ENCOUNTER — APPOINTMENT (OUTPATIENT)
Dept: OTOLARYNGOLOGY | Facility: CLINIC | Age: 53
End: 2018-01-20
Payer: MEDICAID

## 2018-01-20 ENCOUNTER — OUTPATIENT (OUTPATIENT)
Dept: OUTPATIENT SERVICES | Facility: HOSPITAL | Age: 53
LOS: 1 days | Discharge: HOME | End: 2018-01-20

## 2018-01-20 VITALS
HEART RATE: 64 BPM | DIASTOLIC BLOOD PRESSURE: 67 MMHG | SYSTOLIC BLOOD PRESSURE: 94 MMHG | WEIGHT: 203 LBS | HEIGHT: 69 IN | BODY MASS INDEX: 30.07 KG/M2

## 2018-01-20 DIAGNOSIS — H93.12 TINNITUS, LEFT EAR: ICD-10-CM

## 2018-01-20 DIAGNOSIS — H83.02 LABYRINTHITIS, LEFT EAR: ICD-10-CM

## 2018-01-20 DIAGNOSIS — M54.2 CERVICALGIA: ICD-10-CM

## 2018-01-20 DIAGNOSIS — H90.3 SENSORINEURAL HEARING LOSS, BILATERAL: ICD-10-CM

## 2018-01-20 PROCEDURE — 99204 OFFICE O/P NEW MOD 45 MIN: CPT

## 2018-01-20 RX ORDER — MECLIZINE HYDROCHLORIDE 25 MG/1
25 TABLET ORAL
Qty: 14 | Refills: 0 | Status: DISCONTINUED | COMMUNITY
Start: 2017-10-07

## 2018-01-20 RX ORDER — FLUOCINOLONE ACETONIDE 0.1 MG/ML
0.01 SOLUTION TOPICAL
Qty: 60 | Refills: 0 | Status: ACTIVE | COMMUNITY
Start: 2017-11-09

## 2018-01-20 RX ORDER — PREDNISONE 10 MG/1
10 TABLET ORAL
Qty: 42 | Refills: 0 | Status: DISCONTINUED | COMMUNITY
Start: 2017-10-23

## 2018-01-20 RX ORDER — OMEPRAZOLE 40 MG/1
40 CAPSULE, DELAYED RELEASE ORAL
Qty: 30 | Refills: 0 | Status: ACTIVE | COMMUNITY
Start: 2017-10-10

## 2018-01-20 RX ORDER — TRIAMCINOLONE ACETONIDE 1 MG/G
0.1 OINTMENT TOPICAL
Qty: 454 | Refills: 0 | Status: ACTIVE | COMMUNITY
Start: 2017-11-09

## 2018-01-20 RX ORDER — HYDROCORTISONE 25 MG/ML
2.5 LOTION TOPICAL
Qty: 59 | Refills: 0 | Status: ACTIVE | COMMUNITY
Start: 2017-11-09

## 2018-01-23 ENCOUNTER — APPOINTMENT (OUTPATIENT)
Dept: NEUROLOGY | Facility: HOSPITAL | Age: 53
End: 2018-01-23

## 2018-01-23 DIAGNOSIS — M54.2 CERVICALGIA: ICD-10-CM

## 2018-01-23 DIAGNOSIS — H93.12 TINNITUS, LEFT EAR: ICD-10-CM

## 2018-01-23 DIAGNOSIS — H90.3 SENSORINEURAL HEARING LOSS, BILATERAL: ICD-10-CM

## 2018-01-23 DIAGNOSIS — H83.02 LABYRINTHITIS, LEFT EAR: ICD-10-CM

## 2019-01-26 ENCOUNTER — EMERGENCY (EMERGENCY)
Facility: HOSPITAL | Age: 54
LOS: 0 days | Discharge: HOME | End: 2019-01-26
Attending: EMERGENCY MEDICINE
Payer: MEDICAID

## 2019-01-26 VITALS
RESPIRATION RATE: 16 BRPM | SYSTOLIC BLOOD PRESSURE: 114 MMHG | DIASTOLIC BLOOD PRESSURE: 65 MMHG | HEIGHT: 69 IN | HEART RATE: 98 BPM | OXYGEN SATURATION: 99 % | TEMPERATURE: 101 F | WEIGHT: 195.11 LBS

## 2019-01-26 VITALS
TEMPERATURE: 99 F | OXYGEN SATURATION: 97 % | RESPIRATION RATE: 17 BRPM | SYSTOLIC BLOOD PRESSURE: 112 MMHG | HEART RATE: 87 BPM | DIASTOLIC BLOOD PRESSURE: 70 MMHG

## 2019-01-26 DIAGNOSIS — J10.1 INFLUENZA DUE TO OTHER IDENTIFIED INFLUENZA VIRUS WITH OTHER RESPIRATORY MANIFESTATIONS: ICD-10-CM

## 2019-01-26 DIAGNOSIS — R05 COUGH: ICD-10-CM

## 2019-01-26 DIAGNOSIS — R52 PAIN, UNSPECIFIED: ICD-10-CM

## 2019-01-26 DIAGNOSIS — R50.9 FEVER, UNSPECIFIED: ICD-10-CM

## 2019-01-26 LAB
ALBUMIN SERPL ELPH-MCNC: 3.5 G/DL — SIGNIFICANT CHANGE UP (ref 3.3–5)
ALP SERPL-CCNC: 74 U/L — SIGNIFICANT CHANGE UP (ref 40–120)
ALT FLD-CCNC: 45 U/L — SIGNIFICANT CHANGE UP (ref 12–78)
ANION GAP SERPL CALC-SCNC: 7 MMOL/L — SIGNIFICANT CHANGE UP (ref 5–17)
AST SERPL-CCNC: 26 U/L — SIGNIFICANT CHANGE UP (ref 15–37)
BILIRUB SERPL-MCNC: 0.5 MG/DL — SIGNIFICANT CHANGE UP (ref 0.2–1.2)
BUN SERPL-MCNC: 12 MG/DL — SIGNIFICANT CHANGE UP (ref 7–23)
CALCIUM SERPL-MCNC: 8.5 MG/DL — SIGNIFICANT CHANGE UP (ref 8.5–10.1)
CHLORIDE SERPL-SCNC: 108 MMOL/L — SIGNIFICANT CHANGE UP (ref 96–108)
CO2 SERPL-SCNC: 24 MMOL/L — SIGNIFICANT CHANGE UP (ref 22–31)
CREAT SERPL-MCNC: 1.04 MG/DL — SIGNIFICANT CHANGE UP (ref 0.5–1.3)
FLU A RESULT: DETECTED
FLU A RESULT: DETECTED
FLUAV AG NPH QL: DETECTED
FLUBV AG NPH QL: SIGNIFICANT CHANGE UP
GLUCOSE SERPL-MCNC: 101 MG/DL — HIGH (ref 70–99)
HCT VFR BLD CALC: 39 % — SIGNIFICANT CHANGE UP (ref 39–50)
HGB BLD-MCNC: 13.1 G/DL — SIGNIFICANT CHANGE UP (ref 13–17)
MCHC RBC-ENTMCNC: 28.4 PG — SIGNIFICANT CHANGE UP (ref 27–34)
MCHC RBC-ENTMCNC: 33.6 GM/DL — SIGNIFICANT CHANGE UP (ref 32–36)
MCV RBC AUTO: 84.4 FL — SIGNIFICANT CHANGE UP (ref 80–100)
NRBC # BLD: 0 /100 WBCS — SIGNIFICANT CHANGE UP (ref 0–0)
PLATELET # BLD AUTO: 175 K/UL — SIGNIFICANT CHANGE UP (ref 150–400)
POTASSIUM SERPL-MCNC: 3.9 MMOL/L — SIGNIFICANT CHANGE UP (ref 3.5–5.3)
POTASSIUM SERPL-SCNC: 3.9 MMOL/L — SIGNIFICANT CHANGE UP (ref 3.5–5.3)
PROT SERPL-MCNC: 7.4 GM/DL — SIGNIFICANT CHANGE UP (ref 6–8.3)
RBC # BLD: 4.62 M/UL — SIGNIFICANT CHANGE UP (ref 4.2–5.8)
RBC # FLD: 12.4 % — SIGNIFICANT CHANGE UP (ref 10.3–14.5)
RSV RESULT: SIGNIFICANT CHANGE UP
RSV RNA RESP QL NAA+PROBE: SIGNIFICANT CHANGE UP
SODIUM SERPL-SCNC: 139 MMOL/L — SIGNIFICANT CHANGE UP (ref 135–145)
WBC # BLD: 5.34 K/UL — SIGNIFICANT CHANGE UP (ref 3.8–10.5)
WBC # FLD AUTO: 5.34 K/UL — SIGNIFICANT CHANGE UP (ref 3.8–10.5)

## 2019-01-26 PROCEDURE — 99284 EMERGENCY DEPT VISIT MOD MDM: CPT

## 2019-01-26 PROCEDURE — 71045 X-RAY EXAM CHEST 1 VIEW: CPT | Mod: 26

## 2019-01-26 RX ORDER — SODIUM CHLORIDE 9 MG/ML
1000 INJECTION INTRAMUSCULAR; INTRAVENOUS; SUBCUTANEOUS ONCE
Qty: 0 | Refills: 0 | Status: COMPLETED | OUTPATIENT
Start: 2019-01-26 | End: 2019-01-26

## 2019-01-26 RX ORDER — ACETAMINOPHEN 500 MG
2 TABLET ORAL
Qty: 40 | Refills: 0 | OUTPATIENT
Start: 2019-01-26 | End: 2019-01-30

## 2019-01-26 RX ORDER — AZITHROMYCIN 500 MG/1
1 TABLET, FILM COATED ORAL
Qty: 4 | Refills: 0
Start: 2019-01-26 | End: 2019-01-29

## 2019-01-26 RX ORDER — AZITHROMYCIN 500 MG/1
1 TABLET, FILM COATED ORAL
Qty: 4 | Refills: 0 | OUTPATIENT
Start: 2019-01-26 | End: 2019-01-29

## 2019-01-26 RX ORDER — ACETAMINOPHEN 500 MG
2 TABLET ORAL
Qty: 40 | Refills: 0
Start: 2019-01-26 | End: 2019-01-30

## 2019-01-26 RX ORDER — IBUPROFEN 200 MG
600 TABLET ORAL ONCE
Qty: 0 | Refills: 0 | Status: COMPLETED | OUTPATIENT
Start: 2019-01-26 | End: 2019-01-26

## 2019-01-26 RX ADMIN — SODIUM CHLORIDE 1000 MILLILITER(S): 9 INJECTION INTRAMUSCULAR; INTRAVENOUS; SUBCUTANEOUS at 22:51

## 2019-01-26 RX ADMIN — Medication 600 MILLIGRAM(S): at 21:30

## 2019-01-26 RX ADMIN — SODIUM CHLORIDE 1000 MILLILITER(S): 9 INJECTION INTRAMUSCULAR; INTRAVENOUS; SUBCUTANEOUS at 21:00

## 2019-01-26 RX ADMIN — Medication 75 MILLIGRAM(S): at 22:51

## 2019-01-26 RX ADMIN — Medication 600 MILLIGRAM(S): at 21:00

## 2019-01-26 NOTE — ED PROVIDER NOTE - PHYSICAL EXAMINATION
Vitals: WNL  Gen: AAOx3, NAD, sitting uncomfortably in stretcher, diaphoretic, answering appropriately, non-toxic   Head: ncat, perrla, eomi b/l  Neck: supple, no lymphadenopathy, no midline deviation  Heart: rrr, no m/r/g  Lungs: CTA b/l, no rales/ronchi/wheezes  Abd: soft, nontender, non-distended, no rebound or guarding  Ext: no clubbing/cyanosis/edema  Neuro: sensation and muscle strength intact b/l, steady gait

## 2019-01-26 NOTE — ED ADULT TRIAGE NOTE - CHIEF COMPLAINT QUOTE
patient reports body aches fever since yesterday, states I think it is because I walked outside one morning with no coat took advil at 11am. came back from [place] on thursday

## 2019-01-26 NOTE — ED ADULT NURSE NOTE - OBJECTIVE STATEMENT
Received in bed 20. AOX4 c/o generalized body aches and chills.   Labs/IVF completed. Dr. Romo DC EKG and not completed.

## 2019-01-26 NOTE — ED PROVIDER NOTE - PROGRESS NOTE DETAILS
Results reported to patient--grossly benign, labs wnl, flu positive on swab, xr has questionable L cardiac infiltrate, will give antbx to cover CAP  Pt. reports feeling better after meds  pt. agrees to f/u with primary care outpt.  pt. understands to return to ED if symptoms worsen; will d/c

## 2019-01-26 NOTE — ED PROVIDER NOTE - OBJECTIVE STATEMENT
52 yo M with cough, fever, congestion, body aches, chills for 1 day.  Pt. admits to walking outside without a jacket; he thinks he might have gotten sick this way.  Pt. notes recent travel to [place], but no sick contacts.  He feels well otherwise.   ROS: negative for headache, chest pain, shortness of breath, abd pain, nausea, vomiting, diarrhea, rash, paresthesia, and weakness--all other systems reviewed are negative.   PMH: negative; Meds: Denies; SH: Denies smoking/drinking/drug use

## 2019-07-11 NOTE — ED CDU PROVIDER NOTE - CPE EDP RESP NORM
FOLLOW UP OFFICE VISIT    DATE: 19    PATIENT: Griselda Peoples  MRN:  [id number]  :  1955      Chief Complaint   Patient presents with   • Follow-up         HISTORY OF PRESENT ILLNESS:  Griselda Peoples is a 63 year old female has had problems with recurrent cellulitis in both lower extremities.  She has been treated at the wound clinic at the [facility].  This apparently is associate with significant pain.    Patient's weight has dropped approximately 31 pounds.  She currently is 303 pounds.    Patient blood pressures 106/72.  He has been well controlled.  Pain chest pressure heaviness.  Patient states her breathing is been quite good.  She can walk up a flight of stairs slowly without any respiratory distress but does have leg pain.  She denies any orthopnea or PND.  She denies any palpitations presyncope or syncope.  She does have leg swelling secondary to the cellulitis and dermatitis.    It has been approximately a year and a half since her angioplasty.  Patient is doing well and she can stop clopidogrel and just be on aspirin.  This was discussed.    Medications were reviewed.  Patient is on metoprolol, potassium, furosemide, spironolactone, atorvastatin, Plavix, and losartan      -------------------------------     Impression     -------------------------------    #1 CAD  Status post acute anteroapical myocardial infarctions 8/15/2017  Infarct probably occurred 3 days prior to admission    Angioplasty 8/15/2017  % mid stented  LAD distal 80% stented  EF 40% with severe septal apical anterior akinesis    2.  Ischemic cardiomyopathy  EF 40%.    3.  Significant lower extremity cellulitis and dermatitis.  Being treated at the wound clinic at [facility]    4.  Morbid obesity.  Has lost 31 pounds and currently weighs 325    5.  Hypercholesterolemia  On a statin      ALLERGIES:  No Known Allergies      Current Outpatient Medications   Medication   • aspirin 81 MG tablet   • metoPROLOL  succinate (TOPROL-XL) 25 MG 24 hr tablet   • potassium CHLORIDE (KLOR-CON M) 20 MEQ lydia ER tablet   • furosemide (LASIX) 20 MG tablet   • spironolactone (ALDACTONE) 25 MG tablet   • atorvastatin (LIPITOR) 40 MG tablet   • clopidogrel (PLAVIX) 75 MG tablet   • losartan (COZAAR) 25 MG tablet   • Multiple Vitamin tablet   • doxycycline hyclate (VIBRAMYCIN) 100 MG capsule   • albuterol (PROAIR HFA) 108 (90 Base) MCG/ACT inhaler   • fluticasone (FLONASE) 50 MCG/ACT nasal spray     No current facility-administered medications for this visit.          Family History   Problem Relation Age of Onset   • Heart disease Father    • Congestive Heart Failure Father          Review of Systems   Constitutional: Negative for fatigue.   HENT: Negative for congestion.    Eyes: Negative.    Respiratory: Negative for chest tightness, shortness of breath and wheezing.    Cardiovascular: Negative for chest pain, palpitations and leg swelling.   Gastrointestinal: Negative for abdominal pain and nausea.   Endocrine: Negative.    Genitourinary: Negative for dysuria and hematuria.   Musculoskeletal: Negative for arthralgias and back pain.        Difficulty walking due to cellulitis and dermatitis lower extremities   Allergic/Immunologic: Negative.    Neurological: Negative for dizziness, syncope, weakness, light-headedness, numbness and headaches.   Hematological: Negative.    Psychiatric/Behavioral: Negative for confusion. The patient is not nervous/anxious.          Visit Vitals  /72   Pulse 72   Ht 6' (1.829 m)   Wt (!) 137.4 kg (303 lb)   BMI 41.09 kg/m²         Physical Exam   Constitutional: She is oriented to person, place, and time.   Significant obesity   HENT:   Head: Normocephalic.   Eyes: Pupils are equal, round, and reactive to light. EOM are normal.   Neck: Normal range of motion. No JVD present. No thyromegaly present.   Cardiovascular: Normal rate, regular rhythm, normal heart sounds and intact distal pulses. Exam  reveals no gallop and no friction rub.   No murmur heard.  Pulmonary/Chest: Breath sounds normal. No respiratory distress. She has no wheezes. She has no rales. She exhibits no tenderness.   Abdominal: Soft. Bowel sounds are normal. She exhibits no distension. There is no tenderness.   Musculoskeletal: Normal range of motion. She exhibits no edema, tenderness or deformity.   Lymphadenopathy:     She has no cervical adenopathy.   Neurological: She is alert and oriented to person, place, and time.   Skin: Skin is warm and dry. No rash noted. No erythema.   Psychiatric: She has a normal mood and affect. Her behavior is normal.   Nursing note and vitals reviewed.      Clinical Data:  The following were personally visualized and interpreted by me:    ECG:     Echocardiogram:     Stress test:     Cardiac cath: 8/15/2017  % stented  Distal LAD 80% stented  EF 40% with severe apical anterior akinesis        ASSESSMENT/PLAN:  Griselda was seen today for follow-up.    Diagnoses and all orders for this visit:    Coronary artery disease of native artery of native heart with stable angina pectoris (CMS/[facility])    H/O heart artery stent    Pure hypercholesterolemia    Essential hypertension        From a cardiac standpoint this patient appears to be doing quite well.  She has not had any anginal symptoms and is moderately active.    I reviewed the patient's cardiac catheterization and revascularization.  I also discussed this with the patient.    Patient did have a significant ischemic cardiomyopathy with a EF of 40%.  It is hard to determine her exercise capacity because she is inactive.    Patient's angioplasty was greater than a year ago.  She can stop Plavix and continue aspirin per day.    Patient needs to continue the other cardiac medications as well.  That includes  Metoprolol 25 mg/day  Potassium chloride  Furosemide 20 mg twice daily  Spironolactone 25 mg half tablet per day  Atorvastatin 40 mg/day  Losartan 25  mg/day    Patient will follow-up in 1 year.  Sooner if she has any cardiac symptoms.      Marcin Silva MD  7/11/2019             normal...

## 2019-09-23 ENCOUNTER — EMERGENCY (EMERGENCY)
Facility: HOSPITAL | Age: 54
LOS: 0 days | Discharge: HOME | End: 2019-09-23
Payer: MEDICAID

## 2019-09-23 VITALS
WEIGHT: 192.02 LBS | HEART RATE: 72 BPM | RESPIRATION RATE: 20 BRPM | DIASTOLIC BLOOD PRESSURE: 68 MMHG | SYSTOLIC BLOOD PRESSURE: 138 MMHG | TEMPERATURE: 98 F | HEIGHT: 69 IN | OXYGEN SATURATION: 100 %

## 2019-09-23 DIAGNOSIS — M79.661 PAIN IN RIGHT LOWER LEG: ICD-10-CM

## 2019-09-23 DIAGNOSIS — M79.651 PAIN IN RIGHT THIGH: ICD-10-CM

## 2019-09-23 PROCEDURE — 93971 EXTREMITY STUDY: CPT | Mod: 26,RT

## 2019-09-23 PROCEDURE — 99284 EMERGENCY DEPT VISIT MOD MDM: CPT

## 2019-09-23 RX ORDER — CYCLOBENZAPRINE HYDROCHLORIDE 10 MG/1
1 TABLET, FILM COATED ORAL
Qty: 12 | Refills: 0
Start: 2019-09-23

## 2019-09-23 RX ORDER — CYCLOBENZAPRINE HYDROCHLORIDE 10 MG/1
10 TABLET, FILM COATED ORAL ONCE
Refills: 0 | Status: COMPLETED | OUTPATIENT
Start: 2019-09-23 | End: 2019-09-23

## 2019-09-23 RX ADMIN — CYCLOBENZAPRINE HYDROCHLORIDE 10 MILLIGRAM(S): 10 TABLET, FILM COATED ORAL at 22:24

## 2019-09-23 NOTE — ED PROVIDER NOTE - OBJECTIVE STATEMENT
53 y/o male with no PMH here c/o R thigh pain x 1 day. 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 denies any fall. he took advil with relief. describes as crampy feeling, no change in sensation. no fevers. no change in gait. pt otherwise has no other complaints    ROS: No fever/chills. No eye pain/changes in vision, No ear pain/sore throat/dysphagia, No chest pain/palpitations. No SOB/cough/. No abdominal pain, N/V/D, no black/bloody bm. No dysuria/frequency/discharge, No headache. No Dizziness.    No rashes or breaks in skin. No numbness/tingling/weakness.

## 2019-09-23 NOTE — ED PROVIDER NOTE - CLINICAL SUMMARY MEDICAL DECISION MAKING FREE TEXT BOX
55 yo male here with R thigh pain, sono neg for DVT, no trauma or injury, likely muscle strain, improved with meds (pt not driving home) pt will fu with pcp, provided ortho fu as well pt is well appearing, in no distress Discussed results and outcome of testing with the patient.  Patient advised to please follow up with their primary care doctor within the next 24 hours and return to the Emergency Department for worsening symptoms or any other concerns.  Patient advised that their doctor may call  to follow up on the specific results of the tests performed today in the emergency department.

## 2019-09-23 NOTE — ED PROVIDER NOTE - PHYSICAL EXAMINATION
Gen: Alert, NAD, well appearing, not toxic  Head: NC, AT, PERRL, EOMI, normal lids/conjunctiva  ENT: B TM WNL, normal hearing, patent oropharynx without erythema/exudate, uvula midline  Neck: +supple, no tenderness/meningismus/JVD, +Trachea midline  Pulm: Bilateral BS, normal resp effort, no wheeze/stridor/retractions  CV: RRR, no M/R/G, +dist pulses  Abd: soft, NT/ND, +BS, no hepatosplenomegaly  Mskel: no edema/erythema/cyanosis RLE without bony tenderness, full rom, able to flex/extend, str 5/5, sensations intact, gait ok, no deformity, no swelling, good pulses  Skin: no rash  Neuro: AAOx3, no sensory/motor deficits, CN 2-12 intact

## 2019-09-23 NOTE — ED PROVIDER NOTE - CONDITION AT DISCHARGE:
Discussion/Summary   Carroll, your pap smear result is normal . You will need to follow up yearly for your annual exam, and for your next pap in 5 years.    Dr. Bhatia        Verified Results  PAP TEST WITH HIGH RISK HPV 2018 12:01AM ZELALEM MOSQUEDA   [2018 12:57PM ZELALEM MOSQUEDA]  neg pap     Test Name Result Flag Reference   PAP WITH HIGH RISK HPV (Report) O    Name: CARROLL LEDESMA       MRN:   [id number]   : 1987           Visit#: [id number]                Gynecologic Cytology Consultation Report      Client:  [facility]      Date Specimen Collected: 18      Accession #: [id number]   Date Specimen Received: 18      Requisition   #:[id number]   Date Reported:      2018 14:10  Location:   [facility]/OB/GYNE   CLINIC                * Addendum Present *   ______________________________________________________________________________   Cytologic Interpretation :      Negative for intraepithelial lesion or malignancy.       Satisfactory for evaluation. Presence of endocervical/transformation zone   component.         TREMAYNE Fagan(ASCP)   ** Electronic Signature ([facility]) 2018  14:10 **      Educational note: The Pap test is a screening test with a well-recognized   false negative rate. The best means available to lower the false negative   rate and to detect early cervical lesions is a Pap test at regular intervals.    All ThinPrep Paps will be reviewed with the aid of the ThinPrep Imaging   System, unless otherwise specified.      ______________________________________________________________________________   Clinical Information:   Menstrual Hx: Regular Menses   Other Clinical Conditions:Pap source: Endocervical   REASON FOR COLLECTION::LOW RISK - ENCOUNTER FOR SCREENING FOR MALIGNANT   NEOPLASM OF CERVIX Z12.4   SOURCE::ENDOCERVICAL      Specimen(s) Submitted:    PAP with High Risk HPV      Procedures/Addenda:    HPV, High Risk_IL:   Date Ordered:   6/27/2018   Date Reported:6/27/2018      Interpretation   Aptima HPV HIGH RISK (TYPES 16,18,31,33,35,39,45,51,52,56,58,59,66,68):   NEGATIVE      The APTIMA HPV Assay is an FDA approved in vitro nucleic acid amplification   test for the qualitative detection of E6/E7 viral messenger RNA (mRNA) from 14   high-risk types of human papillomavirus (HPV) in cervical specimens. The   high-risk HPV types detected by the assay include: 16, 18, 31, 33, 35, 39, 45,   51, 52, 56, 58, 59, 66, and 68. The Aptima HPV Assay does not discriminate   between the 14 high-risk types. Cervical specimens in the Thin Prep Pap Test   vials containing PreservCyt Solution and collected with broom-type or   cytobrush/spatula collection devices may be tested with this assay using the   PANTHER System.       The APTIMA HPV Assay is intended to screen women 21 years and older with   atypical squamous cells of undetermined significance (ASC-US) cervical   cytology results to determine the need for referral to colposcopy. Test   results are not intended to prevent women from proceeding to colposcopy.       In women 30 years and older, the above assay can be used with cervical   cytology to adjunctively screen to assess the presence or absence of high-risk   HPV types. This information, with the physician's assessment of cytology   history, other risk factors, and guidelines, may be used to guide patient   management.                         ** Electronic Signature ** () 6/27/2018 10:47 **            ICD Codes:    Z00.00 Z11.3      Fee Codes:    A: [id number]    HPV_IL: [id number]      Performing Lab Location (Unless otherwise specified):   [address] [address]       Message   please send normal pap letter      Improved

## 2019-09-23 NOTE — ED ADULT NURSE NOTE - NSIMPLEMENTINTERV_GEN_ALL_ED
Implemented All Universal Safety Interventions:  [place] to call system. Call bell, personal items and telephone within reach. Instruct patient to call for assistance. Room bathroom lighting operational. Non-slip footwear when patient is off stretcher. Physically safe environment: no spills, clutter or unnecessary equipment. Stretcher in lowest position, wheels locked, appropriate side rails in place.

## 2019-09-23 NOTE — ED ADULT TRIAGE NOTE - CHIEF COMPLAINT QUOTE
Pt c/o pain to the rt thigh area started about a week ago and worsened yesterday denies any medical hx

## 2021-04-05 ENCOUNTER — APPOINTMENT (OUTPATIENT)
Dept: DISASTER EMERGENCY | Facility: OTHER | Age: 56
End: 2021-04-05
Payer: MEDICAID

## 2021-04-05 PROCEDURE — 0001A: CPT

## 2021-04-26 ENCOUNTER — APPOINTMENT (OUTPATIENT)
Dept: DISASTER EMERGENCY | Facility: OTHER | Age: 56
End: 2021-04-26
Payer: MEDICAID

## 2021-04-26 PROCEDURE — 0002A: CPT

## 2021-07-30 NOTE — ED PROVIDER NOTE - PATIENT PORTAL LINK FT
Pt CO Dizziness and Nausea x 4 days.  Pt states "Last time I was in this ER was for the same thing and they said I had Vertigo."  Noted with HTN, EKG in progress.  Denies vomiting, diarrhea, SOB, Fevers, CP.  FSBG 126 You can access the FollowMyHealth Patient Portal offered by [facility] by registering at the following website: [email or web address]. By joining [employer]’s FollowMyHealth portal, you will also be able to view your health information using other applications (apps) compatible with our system.

## 2021-08-21 ENCOUNTER — EMERGENCY (EMERGENCY)
Facility: HOSPITAL | Age: 56
LOS: 0 days | Discharge: HOME | End: 2021-08-22
Attending: STUDENT IN AN ORGANIZED HEALTH CARE EDUCATION/TRAINING PROGRAM
Payer: MEDICAID

## 2021-08-21 VITALS
WEIGHT: 190.04 LBS | OXYGEN SATURATION: 97 % | DIASTOLIC BLOOD PRESSURE: 70 MMHG | SYSTOLIC BLOOD PRESSURE: 102 MMHG | RESPIRATION RATE: 18 BRPM | HEART RATE: 88 BPM | HEIGHT: 69 IN | TEMPERATURE: 99 F

## 2021-08-21 DIAGNOSIS — R52 PAIN, UNSPECIFIED: ICD-10-CM

## 2021-08-21 DIAGNOSIS — R07.0 PAIN IN THROAT: ICD-10-CM

## 2021-08-21 DIAGNOSIS — R50.9 FEVER, UNSPECIFIED: ICD-10-CM

## 2021-08-21 DIAGNOSIS — U07.1 COVID-19: ICD-10-CM

## 2021-08-21 PROCEDURE — 99284 EMERGENCY DEPT VISIT MOD MDM: CPT

## 2021-08-22 LAB
ALBUMIN SERPL ELPH-MCNC: 3.8 G/DL — SIGNIFICANT CHANGE UP (ref 3.3–5)
ALP SERPL-CCNC: 79 U/L — SIGNIFICANT CHANGE UP (ref 40–120)
ALT FLD-CCNC: 24 U/L — SIGNIFICANT CHANGE UP (ref 12–78)
ANION GAP SERPL CALC-SCNC: 7 MMOL/L — SIGNIFICANT CHANGE UP (ref 5–17)
AST SERPL-CCNC: 15 U/L — SIGNIFICANT CHANGE UP (ref 15–37)
BASOPHILS # BLD AUTO: 0.03 K/UL — SIGNIFICANT CHANGE UP (ref 0–0.2)
BASOPHILS NFR BLD AUTO: 0.5 % — SIGNIFICANT CHANGE UP (ref 0–2)
BILIRUB SERPL-MCNC: 0.7 MG/DL — SIGNIFICANT CHANGE UP (ref 0.2–1.2)
BUN SERPL-MCNC: 13 MG/DL — SIGNIFICANT CHANGE UP (ref 7–23)
CALCIUM SERPL-MCNC: 8.8 MG/DL — SIGNIFICANT CHANGE UP (ref 8.5–10.1)
CHLORIDE SERPL-SCNC: 107 MMOL/L — SIGNIFICANT CHANGE UP (ref 96–108)
CO2 SERPL-SCNC: 28 MMOL/L — SIGNIFICANT CHANGE UP (ref 22–31)
CREAT SERPL-MCNC: 0.93 MG/DL — SIGNIFICANT CHANGE UP (ref 0.5–1.3)
EOSINOPHIL # BLD AUTO: 0.29 K/UL — SIGNIFICANT CHANGE UP (ref 0–0.5)
EOSINOPHIL NFR BLD AUTO: 4.7 % — SIGNIFICANT CHANGE UP (ref 0–6)
GLUCOSE SERPL-MCNC: 97 MG/DL — SIGNIFICANT CHANGE UP (ref 70–99)
HCT VFR BLD CALC: 40.4 % — SIGNIFICANT CHANGE UP (ref 39–50)
HGB BLD-MCNC: 13.8 G/DL — SIGNIFICANT CHANGE UP (ref 13–17)
IMM GRANULOCYTES NFR BLD AUTO: 0.3 % — SIGNIFICANT CHANGE UP (ref 0–1.5)
LYMPHOCYTES # BLD AUTO: 1.79 K/UL — SIGNIFICANT CHANGE UP (ref 1–3.3)
LYMPHOCYTES # BLD AUTO: 29.1 % — SIGNIFICANT CHANGE UP (ref 13–44)
MCHC RBC-ENTMCNC: 28.5 PG — SIGNIFICANT CHANGE UP (ref 27–34)
MCHC RBC-ENTMCNC: 34.2 GM/DL — SIGNIFICANT CHANGE UP (ref 32–36)
MCV RBC AUTO: 83.5 FL — SIGNIFICANT CHANGE UP (ref 80–100)
MONOCYTES # BLD AUTO: 0.79 K/UL — SIGNIFICANT CHANGE UP (ref 0–0.9)
MONOCYTES NFR BLD AUTO: 12.8 % — SIGNIFICANT CHANGE UP (ref 2–14)
NEUTROPHILS # BLD AUTO: 3.24 K/UL — SIGNIFICANT CHANGE UP (ref 1.8–7.4)
NEUTROPHILS NFR BLD AUTO: 52.6 % — SIGNIFICANT CHANGE UP (ref 43–77)
NRBC # BLD: 0 /100 WBCS — SIGNIFICANT CHANGE UP (ref 0–0)
PLATELET # BLD AUTO: 174 K/UL — SIGNIFICANT CHANGE UP (ref 150–400)
POTASSIUM SERPL-MCNC: 4 MMOL/L — SIGNIFICANT CHANGE UP (ref 3.5–5.3)
POTASSIUM SERPL-SCNC: 4 MMOL/L — SIGNIFICANT CHANGE UP (ref 3.5–5.3)
PROT SERPL-MCNC: 7.7 GM/DL — SIGNIFICANT CHANGE UP (ref 6–8.3)
RAPID RVP RESULT: DETECTED
RBC # BLD: 4.84 M/UL — SIGNIFICANT CHANGE UP (ref 4.2–5.8)
RBC # FLD: 12.4 % — SIGNIFICANT CHANGE UP (ref 10.3–14.5)
SARS-COV-2 RNA SPEC QL NAA+PROBE: DETECTED
SODIUM SERPL-SCNC: 142 MMOL/L — SIGNIFICANT CHANGE UP (ref 135–145)
WBC # BLD: 6.16 K/UL — SIGNIFICANT CHANGE UP (ref 3.8–10.5)
WBC # FLD AUTO: 6.16 K/UL — SIGNIFICANT CHANGE UP (ref 3.8–10.5)

## 2021-08-22 PROCEDURE — 71045 X-RAY EXAM CHEST 1 VIEW: CPT | Mod: 26

## 2021-08-22 RX ORDER — SODIUM CHLORIDE 9 MG/ML
1000 INJECTION INTRAMUSCULAR; INTRAVENOUS; SUBCUTANEOUS ONCE
Refills: 0 | Status: COMPLETED | OUTPATIENT
Start: 2021-08-22 | End: 2021-08-22

## 2021-08-22 RX ORDER — AMOXICILLIN 250 MG/5ML
1 SUSPENSION, RECONSTITUTED, ORAL (ML) ORAL
Qty: 20 | Refills: 0
Start: 2021-08-22 | End: 2021-08-31

## 2021-08-22 RX ADMIN — SODIUM CHLORIDE 1000 MILLILITER(S): 9 INJECTION INTRAMUSCULAR; INTRAVENOUS; SUBCUTANEOUS at 00:37

## 2021-08-22 NOTE — ED PROVIDER NOTE - PATIENT PORTAL LINK FT
You can access the FollowMyHealth Patient Portal offered by [facility] by registering at the following website: [email or web address]. By joining [employer]’s FollowMyHealth portal, you will also be able to view your health information using other applications (apps) compatible with our system.

## 2021-08-22 NOTE — ED PROVIDER NOTE - OBJECTIVE STATEMENT
55 y/o M with no pertinent PMHx presents to the ED c/o intermittent fever with body aches and throat pain since Thursday night, which worsened on Friday. Pt reports taking Advil for relief with some improvement, increasing his dosages according to his sx. Endorses sneezing, coughing, difficulty ambulating but denies rhinorrhea, sore throat, CP, SOB, abd pain, N/V/D or weakness. Pt is fully vaccinated against COVID with Pfizer.

## 2021-08-22 NOTE — ED PROVIDER NOTE - CLINICAL SUMMARY MEDICAL DECISION MAKING FREE TEXT BOX
Fully vaccinated Male presenting for fever and malaise. Looks well, normal vitals, plan for labs, fluids, RVP, CXR, reassess, likely discharge. Fully vaccinated Male presenting for fever and malaise. Looks well, normal vitals, probably vital; plan for labs, fluids, RVP, CXR, reassess, likely discharge.    cxr clear. lab work unremarkable. Pt would like to be texted w/ RVP results; cell phone number inputted.  I have discussed with the patient about the ED workup, lab results, diagnostics results, plan for discharge home, need for follow-up with primary care physician/specialists, and return precautions. At this time, the patient does not require further workup in the ED. The patient is subjectively feeling better and would like to be discharged home. The patient had the opportunity to ask questions and I have answered all inquiries. The patient verbalizes understanding and agreement with the plan. The patient is hemodynamically stable, clinically well-appearing, ambulatory, mentating well and ready for discharge home.

## 2022-01-07 NOTE — CONSULT NOTE ADULT - ATTENDING COMMENTS
Home Case discussed with Dr. Menendez. Brain MRI images reviewed with Dr. Sosa: Unremarkable. Patient seen with Dr. Weller. Wife at bedside. He has had no nausea or vomiting since yesterday, and was able to tolerate his breakfast. He can be discharged on p.r.n. meclizine, and should follow up with ENT for otogenic vertigo.

## 2022-06-03 ENCOUNTER — RESULT REVIEW (OUTPATIENT)
Age: 57
End: 2022-06-03

## 2022-12-19 ENCOUNTER — APPOINTMENT (OUTPATIENT)
Dept: ORTHOPEDIC SURGERY | Facility: CLINIC | Age: 57
End: 2022-12-19
Payer: MEDICAID

## 2023-01-09 ENCOUNTER — APPOINTMENT (OUTPATIENT)
Dept: ORTHOPEDIC SURGERY | Facility: CLINIC | Age: 58
End: 2023-01-09
Payer: MEDICAID

## 2023-01-09 VITALS
HEIGHT: 69 IN | HEART RATE: 73 BPM | SYSTOLIC BLOOD PRESSURE: 105 MMHG | DIASTOLIC BLOOD PRESSURE: 69 MMHG | WEIGHT: 192 LBS | BODY MASS INDEX: 28.44 KG/M2

## 2023-01-09 DIAGNOSIS — M17.0 BILATERAL PRIMARY OSTEOARTHRITIS OF KNEE: ICD-10-CM

## 2023-01-09 PROCEDURE — 99203 OFFICE O/P NEW LOW 30 MIN: CPT

## 2023-03-15 ENCOUNTER — APPOINTMENT (OUTPATIENT)
Dept: CARDIOLOGY | Facility: CLINIC | Age: 58
End: 2023-03-15
Payer: MEDICAID

## 2023-03-15 ENCOUNTER — NON-APPOINTMENT (OUTPATIENT)
Age: 58
End: 2023-03-15

## 2023-03-15 VITALS
BODY MASS INDEX: 29.62 KG/M2 | HEART RATE: 79 BPM | SYSTOLIC BLOOD PRESSURE: 106 MMHG | OXYGEN SATURATION: 98 % | HEIGHT: 69 IN | WEIGHT: 200 LBS | DIASTOLIC BLOOD PRESSURE: 70 MMHG

## 2023-03-15 DIAGNOSIS — Z78.9 OTHER SPECIFIED HEALTH STATUS: ICD-10-CM

## 2023-03-15 DIAGNOSIS — Z83.3 FAMILY HISTORY OF DIABETES MELLITUS: ICD-10-CM

## 2023-03-15 DIAGNOSIS — R94.31 ABNORMAL ELECTROCARDIOGRAM [ECG] [EKG]: ICD-10-CM

## 2023-03-15 DIAGNOSIS — E78.5 HYPERLIPIDEMIA, UNSPECIFIED: ICD-10-CM

## 2023-03-15 PROCEDURE — 99204 OFFICE O/P NEW MOD 45 MIN: CPT | Mod: 25

## 2023-03-15 PROCEDURE — 93000 ELECTROCARDIOGRAM COMPLETE: CPT

## 2023-03-15 NOTE — DISCUSSION/SUMMARY
[FreeTextEntry1] : I have ordered an echocardiogram to assess LV function and valvular status.  I will order a regular treadmill stress test to assess cardiac fitness and rule out any provocable ECG changes as well as check vital assessment to exercise.  I recommended a heart healthy lifestyle including a low-saturated fat, low cholesterol diet with improved aerobic physical fitness over time for cardiovascular benefits.  Carbohydrate and sodium restriction along with weight loss over time encouraged.  Please send over recent lab work for review.  We will call the patient with test results and followup with you for general care.  1 year follow-up otherwise if testing is within acceptable limits.

## 2023-03-15 NOTE — HISTORY OF PRESENT ILLNESS
[FreeTextEntry1] : He is a pleasant 57-year-old gentleman with history of borderline dyslipidemia, BMI of 29 kg per metered squared, abnormal ECG with low voltage and nonspecific T wave abnormalities comes to our attention for cardiac assessment.  He is generally healthy and denies current chest symptoms.

## 2023-03-15 NOTE — CARDIOLOGY SUMMARY
[de-identified] : Sinus  Rhythm  Low voltage -possible pulmonary disease.  Nonspecific T wave abnl  ABNORMAL

## 2023-03-27 ENCOUNTER — APPOINTMENT (OUTPATIENT)
Dept: CARDIOLOGY | Facility: CLINIC | Age: 58
End: 2023-03-27

## 2023-05-23 ENCOUNTER — APPOINTMENT (OUTPATIENT)
Dept: CARDIOLOGY | Facility: CLINIC | Age: 58
End: 2023-05-23

## 2023-07-10 ENCOUNTER — APPOINTMENT (OUTPATIENT)
Dept: CARDIOLOGY | Facility: CLINIC | Age: 58
End: 2023-07-10

## 2023-09-20 ENCOUNTER — EMERGENCY (EMERGENCY)
Facility: HOSPITAL | Age: 58
LOS: 0 days | Discharge: HOME | End: 2023-09-21
Attending: STUDENT IN AN ORGANIZED HEALTH CARE EDUCATION/TRAINING PROGRAM
Payer: MEDICAID

## 2023-09-20 VITALS
WEIGHT: 195.11 LBS | SYSTOLIC BLOOD PRESSURE: 122 MMHG | DIASTOLIC BLOOD PRESSURE: 80 MMHG | HEIGHT: 70 IN | RESPIRATION RATE: 18 BRPM | TEMPERATURE: 98 F | OXYGEN SATURATION: 99 % | HEART RATE: 62 BPM

## 2023-09-20 DIAGNOSIS — Z20.822 CONTACT WITH AND (SUSPECTED) EXPOSURE TO COVID-19: ICD-10-CM

## 2023-09-20 DIAGNOSIS — J02.9 ACUTE PHARYNGITIS, UNSPECIFIED: ICD-10-CM

## 2023-09-20 DIAGNOSIS — B34.8 OTHER VIRAL INFECTIONS OF UNSPECIFIED SITE: ICD-10-CM

## 2023-09-20 PROCEDURE — 99284 EMERGENCY DEPT VISIT MOD MDM: CPT

## 2023-09-21 VITALS
HEART RATE: 78 BPM | DIASTOLIC BLOOD PRESSURE: 71 MMHG | RESPIRATION RATE: 18 BRPM | SYSTOLIC BLOOD PRESSURE: 115 MMHG | OXYGEN SATURATION: 98 % | TEMPERATURE: 98 F

## 2023-09-21 RX ORDER — CHLORHEXIDINE GLUCONATE 213 G/1000ML
15 SOLUTION TOPICAL
Qty: 1 | Refills: 0
Start: 2023-09-21 | End: 2023-09-23

## 2023-09-21 RX ORDER — IBUPROFEN 200 MG
1 TABLET ORAL
Qty: 20 | Refills: 0
Start: 2023-09-21 | End: 2023-09-25

## 2023-09-21 RX ORDER — ACETAMINOPHEN 500 MG
2 TABLET ORAL
Qty: 60 | Refills: 0
Start: 2023-09-21 | End: 2023-09-25

## 2023-09-21 NOTE — ED PROVIDER NOTE - OBJECTIVE STATEMENT
viral uri sx + sore throat. Mild oropharyngeal erythema without exudate or tonsillar swelling. uvula midline. tolerating secretions. declining pain meds. rvp + for rhinovirus. Recs for tylenol/motrin, salt water gargles

## 2023-09-21 NOTE — ED ADULT NURSE REASSESSMENT NOTE - NS ED NURSE REASSESS COMMENT FT1
Pt AOx4 with steady gait, Pt reports no acute distress at this time. Pt accepts the d.c from the MD .

## 2023-09-21 NOTE — ED PROVIDER NOTE - NSFOLLOWUPINSTRUCTIONS_ED_ALL_ED_FT
You were evaluated in the ER for sore throat, nasal congestion, and found to have rhinovirus (a cause of common cold). You may take Tylenol 650 mg every 4 hours and Ibuprofen 400mg every 6 hours as needed for pain. A medicated mouthwash has also been sent to your pharmacy to aid with your symptoms. Call your primary care doctor for a follow-up appointment.

## 2023-09-21 NOTE — ED ADULT NURSE NOTE - OBJECTIVE STATEMENT
Pt is a 58y M AOx4 with no sig pmh. Pt reports fever, sore throat, runny nose, sneezing, and flu like symptoms for 3 days. Pt denies n/v/d, sob or cp

## 2023-09-21 NOTE — ED PROVIDER NOTE - PHYSICAL EXAMINATION
General: well appearing in nad, sleeping comfortably in stretcher  HEENT: NC/AT, MMM, PERRL, oropharyngeal erythema without tonsillar exudates. uvula midline. no anterior cervical lymphadenopathy  Cards: RRR no m/r/g  Pulm: CTAB no w/r/r  Abd: soft, nd/nt no rebound or guarding  Ext: no LE edema, ttp, deformities  Skin: no rash  Neuro: Axoxo3, speaking full sentences, face symmetric, ambulatory

## 2023-09-21 NOTE — ED PROVIDER NOTE - PATIENT PORTAL LINK FT
You can access the FollowMyHealth Patient Portal offered by [facility] by registering at the following website: [email or web address]. By joining [employer]’s FollowMyHealth portal, you will also be able to view your health information using other applications (apps) compatible with our system.

## 2023-09-21 NOTE — ED PROVIDER NOTE - CLINICAL SUMMARY MEDICAL DECISION MAKING FREE TEXT BOX
Pt with sore throat, runny nose, body aches, subjective fever. Afebrile. No marked vs abnormalities. Maintaining airway, tolerating secretions, sleeping comfortably. Likely viral etiology. rvp + for rhinovirus. Will give recs for tylenol/motrin/magic mouthwash or salt water gargles. Pt understands recommendations and return precautions.

## 2023-12-18 ENCOUNTER — APPOINTMENT (OUTPATIENT)
Dept: CARDIOLOGY | Facility: CLINIC | Age: 58
End: 2023-12-18

## 2024-06-27 NOTE — ED ADULT TRIAGE NOTE - TEMPERATURE IN CELSIUS (DEGREES C)
Cleanse wound with: wound cleanser, NS or soap and water, pat dry  Primary dressing: apply hydrofera blue (cut slightly larger than wound)  Secondary dressing: cover with rolled 2x2 gauze (for light pressure) and a Band-Aid, Do not leave open to air  Frequency: every day     Edema control: may wear compression stockings for control of swelling.       Follow-up: 1 week      36.9

## 2024-12-10 NOTE — CONSULT NOTE ADULT - PROBLEM SELECTOR RECOMMENDATION 9
"No acute events overnight.  Pain controlled.  Resting in bed. Feeling better this AM.    Vital Signs  Temp: 97.6 °F (36.4 °C)  Temp Source: Oral  Pulse: 90  Heart Rate Source: Monitor  Resp: 18  SpO2: 99 %  Pulse Oximetry Type: Intermittent  Flow (L/min) (Oxygen Therapy): 10  Oxygen Concentration (%): 80  Device (Oxygen Therapy): room air  BP: 134/67  BP Location: Left arm  BP Method: Automatic  Patient Position: Lying  Height and Weight  Height: 5' 4" (162.6 cm)  Height Method: Measured  Weight: 83.9 kg (184 lb 15.5 oz)  Weight Method: Bed Scale  Dosing Weight: 83.9 kg (185 lb)  BSA (Calculated - sq m): 1.95 sq meters  BMI (Calculated): 31.7  Weight in (lb) to have BMI = 25: 145.3]    +FHL/EHL  BCR distally  Dressing c/d/i  SILT distally    Recent Lab Results         12/10/24  0922   12/10/24  0426        Anion Gap   6.0       BUN   37.3       BUN/CREAT RATIO   20       Calcium   7.8       Chloride   118       CO2   16       Creatinine   1.84       eGFR   26       Glucose   111       Hematocrit   30.5       Hemoglobin   9.9       INR   2.5       Magnesium    1.80       MCH   31.2       MCHC   32.5       MCV   96.2       MPV   9.5       nRBC   0.0       Platelet Count   254       POCT Glucose 180         Potassium   4.4       PT   27.8       RBC   3.17       RDW   17.2       Sodium   140       WBC   8.37               A/P:  Status post revision SILVIA  Pain controlled  Overall patient doing well.  Therapy for mobility and ambulation.  DVT PPx  Stable for d/c home per orthopedic perspective  Defer to hospitalist - monitoring renal insufficiency  " 51 y/o M with mild ear pain/tinnitus post q-tip use to aggressively itch ears 2 days ago. Normal ear exam. Pt currently stable.    -Recommend Debrox for ear itching.  -Outpatient ENT follow up at [facility] .  -Discussed with Dr. Guidry

## 2025-09-01 ENCOUNTER — NON-APPOINTMENT (OUTPATIENT)
Age: 60
End: 2025-09-01

## 2025-09-03 ENCOUNTER — NON-APPOINTMENT (OUTPATIENT)
Age: 60
End: 2025-09-03

## 2025-09-03 ENCOUNTER — APPOINTMENT (OUTPATIENT)
Dept: ORTHOPEDIC SURGERY | Facility: CLINIC | Age: 60
End: 2025-09-03
Payer: MEDICAID

## 2025-09-03 VITALS
DIASTOLIC BLOOD PRESSURE: 67 MMHG | WEIGHT: 200 LBS | HEART RATE: 65 BPM | SYSTOLIC BLOOD PRESSURE: 108 MMHG | OXYGEN SATURATION: 99 % | BODY MASS INDEX: 28.63 KG/M2 | HEIGHT: 70 IN

## 2025-09-03 DIAGNOSIS — M51.369: ICD-10-CM

## 2025-09-03 PROCEDURE — 99204 OFFICE O/P NEW MOD 45 MIN: CPT

## 2025-09-03 RX ORDER — DICLOFENAC SODIUM 75 MG/1
75 TABLET, DELAYED RELEASE ORAL
Qty: 60 | Refills: 1 | Status: ACTIVE | COMMUNITY
Start: 2025-09-03 | End: 1900-01-01